# Patient Record
Sex: MALE | Race: WHITE | URBAN - METROPOLITAN AREA
[De-identification: names, ages, dates, MRNs, and addresses within clinical notes are randomized per-mention and may not be internally consistent; named-entity substitution may affect disease eponyms.]

---

## 2020-08-01 ENCOUNTER — EMERGENCY (EMERGENCY)
Facility: HOSPITAL | Age: 50
LOS: 0 days | Discharge: HOME | End: 2020-08-01
Payer: COMMERCIAL

## 2020-08-01 VITALS
HEART RATE: 65 BPM | DIASTOLIC BLOOD PRESSURE: 66 MMHG | TEMPERATURE: 98 F | OXYGEN SATURATION: 98 % | SYSTOLIC BLOOD PRESSURE: 142 MMHG | RESPIRATION RATE: 17 BRPM

## 2020-08-01 DIAGNOSIS — Z20.828 CONTACT WITH AND (SUSPECTED) EXPOSURE TO OTHER VIRAL COMMUNICABLE DISEASES: ICD-10-CM

## 2020-08-01 PROCEDURE — 99283 EMERGENCY DEPT VISIT LOW MDM: CPT

## 2020-08-01 PROCEDURE — U0003: CPT

## 2020-08-01 NOTE — ED STATDOCS - NSFOLLOWUPINSTRUCTIONS_ED_ALL_ED_FT
How to get your Coronavirus (COVID-19) Testing Results:   Please be advised that you were tested for the coronavirus (COVID-19) in the Emergency Department at [facility].  You are to maintain self-quarantine procedures for 14 days until instructed otherwise by one of our healthcare agents. Please note that the test may take up to 2-4 days to result.  If you do not hear from us within 72 hours and you'd like to check on your results, you can call on of our coronavirus specialists at [address] [place] [address] (available 24/7).  Please DO NOT call the site where you received the test to obtain your results.

## 2020-08-01 NOTE — ED ADULT NURSE NOTE - NSIMPLEMENTINTERV_GEN_ALL_ED
Implemented All Universal Safety Interventions:  [place] to call system. Call bell, personal items and telephone within reach. Instruct patient to call for assistance. Room bathroom lighting operational. Non-slip footwear when patient is off stretcher. Physically safe environment: no spills, clutter or unnecessary equipment. Stretcher in lowest position, wheels locked, appropriate side rails in place.

## 2020-08-01 NOTE — ED STATDOCS - OBJECTIVE STATEMENT
Patient presents for screening for COVID19 as there was a positive exposure 4 days ago from daughter's boyfriend's sister indirectly.  Denies symptoms.

## 2020-08-01 NOTE — ED STATDOCS - PATIENT PORTAL LINK FT
You can access the FollowMyHealth Patient Portal offered by [facility] by registering at the following website: [email or web address]. By joining [facility]’s FollowMyHealth portal, you will also be able to view your health information using other applications (apps) compatible with our system.

## 2020-08-02 LAB — SARS-COV-2 RNA SPEC QL NAA+PROBE: SIGNIFICANT CHANGE UP

## 2024-10-17 ENCOUNTER — OFFICE (OUTPATIENT)
Dept: URBAN - METROPOLITAN AREA CLINIC 104 | Facility: CLINIC | Age: 54
Setting detail: OPHTHALMOLOGY
End: 2024-10-17
Payer: COMMERCIAL

## 2024-10-17 DIAGNOSIS — H00.14: ICD-10-CM

## 2024-10-17 PROCEDURE — 99203 OFFICE O/P NEW LOW 30 MIN: CPT | Performed by: OPTOMETRIST

## 2024-10-17 ASSESSMENT — REFRACTION_AUTOREFRACTION
OD_CYLINDER: -1.00
OS_SPHERE: +2.25
OD_AXIS: 106
OD_SPHERE: +2.25
OS_AXIS: 099
OS_CYLINDER: -1.25

## 2024-10-17 ASSESSMENT — REFRACTION_CURRENTRX
OD_CYLINDER: -0.50
OS_AXIS: 081
OS_SPHERE: +2.75
OD_ADD: +2.00
OS_OVR_VA: 20/
OS_CYLINDER: -0.75
OD_SPHERE: +1.75
OS_ADD: +1.75
OD_OVR_VA: 20/
OD_AXIS: 103

## 2024-10-17 ASSESSMENT — CONFRONTATIONAL VISUAL FIELD TEST (CVF)
OS_FINDINGS: FULL
OD_FINDINGS: FULL

## 2024-10-17 ASSESSMENT — KERATOMETRY
OS_K1POWER_DIOPTERS: 41.01
OD_K1POWER_DIOPTERS: 40.71
OS_AXISANGLE_DEGREES: 090
OD_K2POWER_DIOPTERS: 41.06
OD_AXISANGLE_DEGREES: 091
OS_K2POWER_DIOPTERS: 42.19

## 2024-10-17 ASSESSMENT — VISUAL ACUITY
OD_BCVA: 20/70
OS_BCVA: 20/20-1

## 2024-10-17 ASSESSMENT — TONOMETRY: OS_IOP_MMHG: 20

## 2024-10-23 ENCOUNTER — RX ONLY (RX ONLY)
Age: 54
End: 2024-10-23

## 2024-10-23 ENCOUNTER — OFFICE (OUTPATIENT)
Dept: URBAN - METROPOLITAN AREA CLINIC 104 | Facility: CLINIC | Age: 54
Setting detail: OPHTHALMOLOGY
End: 2024-10-23
Payer: COMMERCIAL

## 2024-10-23 DIAGNOSIS — H00.14: ICD-10-CM

## 2024-10-23 PROBLEM — H01.001 BLEPHARITIS; RIGHT UPPER LID, RIGHT LOWER LID, LEFT UPPER LID, LEFT LOWER LID: Status: ACTIVE | Noted: 2024-10-23

## 2024-10-23 PROBLEM — H01.002 BLEPHARITIS; RIGHT UPPER LID, RIGHT LOWER LID, LEFT UPPER LID, LEFT LOWER LID: Status: ACTIVE | Noted: 2024-10-23

## 2024-10-23 PROBLEM — H01.005 BLEPHARITIS; RIGHT UPPER LID, RIGHT LOWER LID, LEFT UPPER LID, LEFT LOWER LID: Status: ACTIVE | Noted: 2024-10-23

## 2024-10-23 PROBLEM — H01.004 BLEPHARITIS; RIGHT UPPER LID, RIGHT LOWER LID, LEFT UPPER LID, LEFT LOWER LID: Status: ACTIVE | Noted: 2024-10-23

## 2024-10-23 PROCEDURE — 92285 EXTERNAL OCULAR PHOTOGRAPHY: CPT | Performed by: OPHTHALMOLOGY

## 2024-10-23 PROCEDURE — 67800 REMOVE EYELID LESION: CPT | Mod: E1 | Performed by: OPHTHALMOLOGY

## 2024-10-23 ASSESSMENT — KERATOMETRY
OS_K2POWER_DIOPTERS: 42.19
OD_K1POWER_DIOPTERS: 40.71
OD_AXISANGLE_DEGREES: 091
OS_AXISANGLE_DEGREES: 090
OS_K1POWER_DIOPTERS: 41.01
OD_K2POWER_DIOPTERS: 41.06

## 2024-10-23 ASSESSMENT — LID EXAM ASSESSMENTS
OS_BLEPHARITIS: LLL LUL 2+
OD_BLEPHARITIS: RLL RUL 2+

## 2024-10-23 ASSESSMENT — REFRACTION_AUTOREFRACTION
OS_SPHERE: +2.25
OD_AXIS: 106
OS_CYLINDER: -1.25
OD_SPHERE: +2.25
OS_AXIS: 099
OD_CYLINDER: -1.00

## 2024-10-23 ASSESSMENT — CONFRONTATIONAL VISUAL FIELD TEST (CVF)
OD_FINDINGS: FULL
OS_FINDINGS: FULL

## 2024-10-23 ASSESSMENT — VISUAL ACUITY
OS_BCVA: 20/25
OD_BCVA: 20/40

## 2024-11-07 ENCOUNTER — OFFICE (OUTPATIENT)
Dept: URBAN - METROPOLITAN AREA CLINIC 109 | Facility: CLINIC | Age: 54
Setting detail: OPHTHALMOLOGY
End: 2024-11-07
Payer: COMMERCIAL

## 2024-11-07 DIAGNOSIS — H01.004: ICD-10-CM

## 2024-11-07 DIAGNOSIS — H01.002: ICD-10-CM

## 2024-11-07 DIAGNOSIS — H01.001: ICD-10-CM

## 2024-11-07 DIAGNOSIS — H00.14: ICD-10-CM

## 2024-11-07 DIAGNOSIS — H01.005: ICD-10-CM

## 2024-11-07 PROCEDURE — 99213 OFFICE O/P EST LOW 20 MIN: CPT | Performed by: OPHTHALMOLOGY

## 2024-11-07 ASSESSMENT — LID EXAM ASSESSMENTS
OD_BLEPHARITIS: RLL RUL 2+
OS_BLEPHARITIS: LLL LUL 2+

## 2024-11-07 ASSESSMENT — CONFRONTATIONAL VISUAL FIELD TEST (CVF)
OD_FINDINGS: FULL
OS_FINDINGS: FULL

## 2024-11-08 ASSESSMENT — REFRACTION_AUTOREFRACTION
OD_CYLINDER: -1.00
OD_SPHERE: +2.25
OS_CYLINDER: -1.25
OS_AXIS: 099
OS_SPHERE: +2.25
OD_AXIS: 106

## 2024-11-08 ASSESSMENT — VISUAL ACUITY
OD_BCVA: 20/20-1
OS_BCVA: 20/25

## 2024-11-08 ASSESSMENT — KERATOMETRY
OS_AXISANGLE_DEGREES: 090
OD_K2POWER_DIOPTERS: 41.06
OD_K1POWER_DIOPTERS: 40.71
OD_AXISANGLE_DEGREES: 091
OS_K2POWER_DIOPTERS: 42.19
OS_K1POWER_DIOPTERS: 41.01